# Patient Record
Sex: FEMALE | Race: WHITE | Employment: OTHER | URBAN - METROPOLITAN AREA
[De-identification: names, ages, dates, MRNs, and addresses within clinical notes are randomized per-mention and may not be internally consistent; named-entity substitution may affect disease eponyms.]

---

## 2017-10-30 ENCOUNTER — ALLSCRIPTS OFFICE VISIT (OUTPATIENT)
Dept: OTHER | Facility: OTHER | Age: 82
End: 2017-10-30

## 2018-01-09 NOTE — PROCEDURES
Chief Complaint  Pt here for ear wax removal       Current Meds   1  Acetaminophen 325 MG Oral Tablet; GIVE 2 TABLET ORALLY EVERY 4 HOURS; Therapy: (Recorded:30Oct2017) to Recorded   2  Artificial Tears 1 4 % Ophthalmic Solution; INSTILL 1 DROP FOUR TIMES A DAY; Therapy: (Recorded:30Oct2017) to Recorded   3  Bisac-Evac 10 MG Rectal Suppository; INSERT 1 SUPPOSITORY RECTALLY  EVERY 24   HOURS AS NEEDED; Therapy: (Recorded:30Oct2017) to Recorded   4  Clobetasol Propionate 0 05 % External Ointment; APPLY TO VAGINAL AREA TOPICALLY   ONE TIME A  DAY EVERY MON, FRIDAY; Therapy: (Recorded:30Oct2017) to Recorded   5  Enema ENEM; INSERT 1 SUPPOSITORY RECTALLY EVERY 24 HOURS AS NEEDED; Therapy: (Recorded:30Oct2017) to Recorded   6  Glucosamine 500 MG TABS; GIVE 1 CAPSULE ORALLY ONE TIME A DAY; Therapy: (Recorded:30Oct2017) to Recorded   7  Ibuprofen 400 MG Oral Tablet; GIVE 1 TABLET ORALLY EVERY 6HOURS AS NEEDED   FOR PAIN;   Therapy: (Recorded:30Oct2017) to Recorded   8  Levothyroxine Sodium 75 MCG Oral Tablet; GIVE 1 TABLET ORALLY ONE TIME A DAY; Therapy: (Recorded:30Oct2017) to Recorded   9  Melatonin 3 MG Oral Tablet; GIVE 1 TABLET ORALLY AT BEDTIME; Therapy: (Recorded:30Oct2017) to Recorded    Vitals  Signs    Heart Rate: 113, L Radial  Respiration: 20  Systolic: 433, LUE, Sitting  Diastolic: 80, LUE, Sitting  Weight: 161 lb 6 oz  O2 Saturation: 98    Procedure    Procedure: cerumen removal    Indication: cerumen impaction in both ears  Prep: colace was placed in the canal prior to the procedure  Procedure Note: The procedure was performed by the Provider  A otoscope was placed in the ear canal(s) to visualize the ear canal debris  The ear was cleaned by using warm water irrigation  The procedure was successful  Post-Procedure: Cipro was inserted into the canal(s)  Patient Status: the patient tolerated the procedure well  Complications: there were no complications     Patient instructions: dry ear precautions  Follow-up as needed        Signatures   Electronically signed by : Jamison Romo DO; Oct 30 2017  4:13PM EST                       (Author)

## 2018-01-12 VITALS
HEART RATE: 113 BPM | RESPIRATION RATE: 20 BRPM | OXYGEN SATURATION: 98 % | SYSTOLIC BLOOD PRESSURE: 144 MMHG | WEIGHT: 161.38 LBS | DIASTOLIC BLOOD PRESSURE: 80 MMHG

## 2018-11-13 ENCOUNTER — OFFICE VISIT (OUTPATIENT)
Dept: GERIATRICS | Facility: OTHER | Age: 83
End: 2018-11-13
Payer: MEDICARE

## 2018-11-13 VITALS
HEART RATE: 51 BPM | RESPIRATION RATE: 14 BRPM | DIASTOLIC BLOOD PRESSURE: 58 MMHG | SYSTOLIC BLOOD PRESSURE: 116 MMHG | TEMPERATURE: 97.6 F

## 2018-11-13 DIAGNOSIS — H61.23 HEARING LOSS DUE TO CERUMEN IMPACTION, BILATERAL: Primary | ICD-10-CM

## 2018-11-13 DIAGNOSIS — L30.9 DERMATITIS: ICD-10-CM

## 2018-11-13 PROCEDURE — 99214 OFFICE O/P EST MOD 30 MIN: CPT | Performed by: NURSE PRACTITIONER

## 2018-11-13 PROCEDURE — 69210 REMOVE IMPACTED EAR WAX UNI: CPT | Performed by: NURSE PRACTITIONER

## 2018-11-13 NOTE — PROGRESS NOTES
Ear cerumen removal  Date/Time: 11/13/2018 3:19 PM  Performed by: Jean Earl by: Niki Cabins     Patient location:  Clinic  Indications / Diagnosis:  Hearing loss due to bilateral cerumen impaction  Consent:     Consent obtained:  Verbal    Consent given by:  Patient    Risks discussed:  Bleeding, dizziness, infection, incomplete removal, pain and TM perforation    Alternatives discussed:  No treatment  Universal protocol:     Procedure explained and questions answered to patient or proxy's satisfaction: yes      Patient identity confirmed:  Verbally with patient  Procedure details:     Location:  L ear and R ear    Equipment used:  Ear irrigation syringe

## 2018-11-13 NOTE — PROGRESS NOTES
Assessment/Plan:     1  Dermatitis: Chronic, unknown source  Will discontinue current topical medication due to it irritating her skin  Will order Zinc oxide topical ointment to rash bid  Patient to f/u with Dermatology this coming Thursday 11/15/2018  Continue to keep skin clean and dry  Subjective: Dermatitis     Patient ID: Lesli Ellison is a 80 y o  female  Patient seen and examined today for bilateral ear irrigation and to examine a generalized chronic rash  This rash is in her perineal area, buttocks and back  She has had this rash on and off for the past year  She was treated with multiple medications including anti-fungal medications and oral and topical steroids  She states that the rash is very itchy and burns her on and off during the day and night  Review of Systems   Respiratory: Negative for chest tightness, shortness of breath and wheezing  Cardiovascular: Negative for chest pain, palpitations and leg swelling  Skin: Positive for rash  Negative for color change, pallor and wound  Perineal area, bilateral buttocks and back         Objective:     Physical Exam   Constitutional: She appears well-developed and well-nourished  No distress  Cardiovascular: Normal rate, regular rhythm and normal heart sounds  Exam reveals no gallop and no friction rub  No murmur heard  Pulmonary/Chest: Effort normal and breath sounds normal  No respiratory distress  She has no wheezes  She has no rales  She exhibits no tenderness  Neurological: She is alert  No cranial nerve deficit  Coordination normal    Skin: Rash noted  She is not diaphoretic  There is erythema  No pallor  Generalized erythematous scattered rash to perineal and back area  Bilateral buttocks positive for erythema, inflamed,  with peeling skin  Psychiatric: She has a normal mood and affect   Her behavior is normal  Judgment and thought content normal

## 2018-12-18 ENCOUNTER — OFFICE VISIT (OUTPATIENT)
Dept: GERIATRICS | Facility: OTHER | Age: 83
End: 2018-12-18
Payer: MEDICARE

## 2018-12-18 VITALS
RESPIRATION RATE: 16 BRPM | OXYGEN SATURATION: 97 % | SYSTOLIC BLOOD PRESSURE: 126 MMHG | HEART RATE: 59 BPM | DIASTOLIC BLOOD PRESSURE: 60 MMHG

## 2018-12-18 DIAGNOSIS — Z91.81 STATUS POST FALL: ICD-10-CM

## 2018-12-18 DIAGNOSIS — R26.2 AMBULATORY DYSFUNCTION: Primary | ICD-10-CM

## 2018-12-18 PROBLEM — S22.49XA RIB FRACTURES: Status: ACTIVE | Noted: 2018-12-18

## 2018-12-18 PROBLEM — F03.90 DEMENTIA (HCC): Status: ACTIVE | Noted: 2018-12-18

## 2018-12-18 PROCEDURE — 99214 OFFICE O/P EST MOD 30 MIN: CPT | Performed by: FAMILY MEDICINE

## 2018-12-18 NOTE — ASSESSMENT & PLAN NOTE
-Status post recent fall  Fall seems mechanical in origin, since patient was ambulating without her regular walker  Niece declines PT at this time

## 2018-12-18 NOTE — ASSESSMENT & PLAN NOTE
-Patient ambulates with a walker at baseline   -She did have a recent fall that resulted in L sided rib fractures  -Offered PT referral, niece declined  -Fall precautions are recommended

## 2018-12-18 NOTE — PROGRESS NOTES
Assessment & Plan:     Ambulatory dysfunction  -Patient ambulates with a walker at baseline   -She did have a recent fall that resulted in L sided rib fractures  -Offered PT referral, niece declined  -Fall precautions are recommended  Status post fall  -Status post recent fall  Fall seems mechanical in origin, since patient was ambulating without her regular walker  Niece declines PT at this time  Rib fractures  Complainint of intermittent pain, more prominent with movement  Continue ATC Tylenol for 2 weeks, then resume prn tylenol  Continue Aspercreme-Lidocaine patch to L rib cage area  Continue incentive spirometry  Dementia  No behavioral disturbance  Patient seems to be at baseline  Continue supportive care and delirium precautions  Patient lives in [facility] for about 3 years due to dementia  She presents today with her niece, Narendra Harris  They are here for follow up today  She had a recent fall the past week and was taken to [facility]   She states she does not remember falling  Per staff she was ambulating without her walker  Patient was found to have L 6th, 7th and 8th R fractures  She has since been complaining of pain that bothers her mostly with movement  She has been receiving tylenol as needed for pain with relief as per niece  Patient's niece denies any behaviors, states she is eating and drinking well,   No recent weight gain/loss  Patient is a very poor historian but admits to pain with movement  ROS: Review of Systems   Constitutional: Negative for activity change, appetite change, fatigue, fever and unexpected weight change  HENT: Negative for congestion, dental problem and trouble swallowing  Eyes: Negative for visual disturbance  Respiratory: Negative for apnea, cough, choking, chest tightness and shortness of breath  Cardiovascular: Negative for chest pain, palpitations and leg swelling     Gastrointestinal: Negative for abdominal distention, abdominal pain, constipation, diarrhea, nausea and vomiting  Genitourinary: Negative for difficulty urinating, dysuria and flank pain  Musculoskeletal: Negative for arthralgias and gait problem  Skin: Negative for rash and wound  Psychiatric/Behavioral: Negative for agitation and behavioral problems  Past Medical, surgical, social, medication and allergy history and patients previous records reviewed  Allergies: Allergies not on file    Medications:    No current outpatient prescriptions on file  Vitals:  Vitals:    12/18/18 1522   BP: 126/60   Pulse: 59   Resp: 16   SpO2: 97%       History:  No past medical history on file  No past surgical history on file  No family history on file  Social History     Social History    Marital status:      Spouse name: N/A    Number of children: N/A    Years of education: N/A     Occupational History    Not on file  Social History Main Topics    Smoking status: Not on file    Smokeless tobacco: Not on file    Alcohol use Not on file    Drug use: Unknown    Sexual activity: Not on file     Other Topics Concern    Not on file     Social History Narrative    No narrative on file     No past surgical history on file  Physical Exam:   Physical Exam   Constitutional: She appears well-developed  No distress  HENT:   Head: Normocephalic and atraumatic  Right Ear: External ear normal    Left Ear: External ear normal    Mouth/Throat: Oropharynx is clear and moist  No oropharyngeal exudate  Eyes: Pupils are equal, round, and reactive to light  Conjunctivae are normal  Right eye exhibits no discharge  Left eye exhibits no discharge  No scleral icterus  Neck: No JVD present  No tracheal deviation present  Cardiovascular: Normal rate, regular rhythm, normal heart sounds and intact distal pulses  Exam reveals no gallop and no friction rub  No murmur heard    Pulmonary/Chest: Breath sounds normal  No respiratory distress  Abdominal: Soft  Bowel sounds are normal  She exhibits no distension  There is no tenderness  Musculoskeletal: She exhibits no edema, tenderness or deformity  Neurological: She displays normal reflexes  No cranial nerve deficit  Skin: No rash noted  She is not diaphoretic  No erythema  No pallor

## 2018-12-18 NOTE — ASSESSMENT & PLAN NOTE
Complainint of intermittent pain, more prominent with movement  Continue ATC Tylenol for 2 weeks, then resume prn tylenol  Continue Aspercreme-Lidocaine patch to L rib cage area  Continue incentive spirometry

## 2018-12-18 NOTE — ASSESSMENT & PLAN NOTE
No behavioral disturbance  Patient seems to be at baseline  Continue supportive care and delirium precautions

## 2019-02-18 ENCOUNTER — OFFICE VISIT (OUTPATIENT)
Dept: GERIATRICS | Facility: OTHER | Age: 84
End: 2019-02-18
Payer: MEDICARE

## 2019-02-18 VITALS
WEIGHT: 154 LBS | SYSTOLIC BLOOD PRESSURE: 152 MMHG | RESPIRATION RATE: 16 BRPM | DIASTOLIC BLOOD PRESSURE: 60 MMHG | HEART RATE: 52 BPM | OXYGEN SATURATION: 98 %

## 2019-02-18 DIAGNOSIS — H61.23 HEARING LOSS DUE TO CERUMEN IMPACTION, BILATERAL: Primary | ICD-10-CM

## 2019-02-18 PROCEDURE — 69209 REMOVE IMPACTED EAR WAX UNI: CPT | Performed by: NURSE PRACTITIONER

## 2019-02-18 PROCEDURE — 69210 REMOVE IMPACTED EAR WAX UNI: CPT | Performed by: NURSE PRACTITIONER

## 2019-02-18 RX ORDER — LANOLIN ALCOHOL/MO/W.PET/CERES
CREAM (GRAM) TOPICAL
COMMUNITY

## 2019-02-18 RX ORDER — TRIAMCINOLONE ACETONIDE 1 MG/G
CREAM TOPICAL
COMMUNITY
Start: 2018-11-29

## 2019-02-18 RX ORDER — MULTIVITAMIN,THERAPEUTIC
1 TABLET ORAL DAILY
COMMUNITY

## 2019-02-18 RX ORDER — BISACODYL 10 MG
SUPPOSITORY, RECTAL RECTAL
COMMUNITY

## 2019-02-18 RX ORDER — ACETAMINOPHEN 325 MG/1
TABLET ORAL
COMMUNITY

## 2019-02-18 RX ORDER — GLUCOSAMINE SULFATE 500 MG
CAPSULE ORAL
COMMUNITY

## 2019-02-18 RX ORDER — SENNA PLUS 8.6 MG/1
TABLET ORAL
COMMUNITY

## 2019-02-18 RX ORDER — POLYVINYL ALCOHOL 14 MG/ML
SOLUTION/ DROPS OPHTHALMIC
COMMUNITY

## 2019-02-18 RX ORDER — SALINE 7; 19 G/118ML; G/118ML
ENEMA RECTAL
COMMUNITY

## 2019-02-18 RX ORDER — ACETAMINOPHEN 160 MG
TABLET,DISINTEGRATING ORAL
COMMUNITY

## 2019-02-18 RX ORDER — LEVOTHYROXINE SODIUM 0.07 MG/1
TABLET ORAL
COMMUNITY

## 2019-02-19 NOTE — PROGRESS NOTES
Ear cerumen removal  Date/Time: 2/18/2019 4:30 PM  Performed by: JELLY Rees  Authorized by: JELLY Rees     Patient location:  Clinic  Indications / Diagnosis:  Bilateral hearing loss due to cerumen impaction  Other Assisting Provider: No    Consent:     Consent obtained:  Verbal    Consent given by:  Patient and healthcare agent    Risks discussed:  Bleeding, dizziness, incomplete removal, infection, pain and TM perforation    Alternatives discussed:  No treatment  Universal protocol:     Procedure explained and questions answered to patient or proxy's satisfaction: yes      Patient identity confirmed:  Verbally with patient  Procedure details:     Local anesthetic:  None    Location:  L ear and R ear    Procedure type: irrigation      Approach:  External and natural orifice    Equipment used:  Ear irrigation syringe, curette  Post-procedure details:     Complication:  None    Hearing quality:  Normal    Patient tolerance of procedure:   Tolerated well, no immediate complications

## 2019-03-22 ENCOUNTER — OFFICE VISIT (OUTPATIENT)
Dept: GERIATRICS | Facility: OTHER | Age: 84
End: 2019-03-22
Payer: MEDICARE

## 2019-03-22 VITALS — HEART RATE: 60 BPM | SYSTOLIC BLOOD PRESSURE: 140 MMHG | OXYGEN SATURATION: 96 % | DIASTOLIC BLOOD PRESSURE: 82 MMHG

## 2019-03-22 DIAGNOSIS — L30.9 DERMATITIS: ICD-10-CM

## 2019-03-22 DIAGNOSIS — G30.9 ALZHEIMER'S DEMENTIA WITHOUT BEHAVIORAL DISTURBANCE, UNSPECIFIED TIMING OF DEMENTIA ONSET (HCC): Primary | ICD-10-CM

## 2019-03-22 DIAGNOSIS — Z91.81 PERSONAL HISTORY OF FALL: ICD-10-CM

## 2019-03-22 DIAGNOSIS — I10 ESSENTIAL HYPERTENSION: ICD-10-CM

## 2019-03-22 DIAGNOSIS — I48.0 PAROXYSMAL ATRIAL FIBRILLATION (HCC): ICD-10-CM

## 2019-03-22 DIAGNOSIS — F02.80 ALZHEIMER'S DEMENTIA WITHOUT BEHAVIORAL DISTURBANCE, UNSPECIFIED TIMING OF DEMENTIA ONSET (HCC): Primary | ICD-10-CM

## 2019-03-22 DIAGNOSIS — K59.09 OTHER CONSTIPATION: ICD-10-CM

## 2019-03-22 DIAGNOSIS — E03.8 OTHER SPECIFIED HYPOTHYROIDISM: ICD-10-CM

## 2019-03-22 DIAGNOSIS — G47.09 OTHER INSOMNIA: ICD-10-CM

## 2019-03-22 DIAGNOSIS — R26.2 AMBULATORY DYSFUNCTION: ICD-10-CM

## 2019-03-22 PROCEDURE — 99214 OFFICE O/P EST MOD 30 MIN: CPT | Performed by: NURSE PRACTITIONER

## 2019-03-22 NOTE — ASSESSMENT & PLAN NOTE
Stable, no issues reported by staff  Continue current bowel regime  Encourage PO fluid intake and fiber rich foods

## 2019-03-22 NOTE — PROGRESS NOTES
Assessment/Plan:    Dementia  Stable, no behaviors reported  Continue supportive care and delirium precautions  Personal history of fall  No recent fall reported  Continue to encourage patient to use roller walker for assistance and fall precautions  Dermatitis  Currently resolved  Continue to monitor  Follow-up with Dermatology as needed  Ambulatory dysfunction  No recent falls reported  Continue assistance with roller walker and fall precautions  Her POA refused PT consult after last fall  Essential hypertension  Controlled, she has had some intermittent elevated b/p's for the past month, continue metoprolol  Will monitor electrolytes and renal function  Will continue to monitor vital signs, if B/P remains elevated, will adjust Metoprolol  Other specified hypothyroidism  Stable, continue Levothyroxine  Will monitor TSH level  Other constipation  Stable, no issues reported by staff  Continue current bowel regime  Encourage PO fluid intake and fiber rich foods  Paroxysmal atrial fibrillation (HCC)  Rate controlled, patient not on anticoagulation related to fall risk  Continue Metoprolol  Will monitor electrolytes and renal function  Other insomnia  Stable, no issues reported by nursing  Continue Melatonin and good sleep hygiene  Problem List Items Addressed This Visit        Endocrine    Other specified hypothyroidism     Stable, continue Levothyroxine  Will monitor TSH level  Cardiovascular and Mediastinum    Essential hypertension     Controlled, she has had some intermittent elevated b/p's for the past month, continue metoprolol  Will monitor electrolytes and renal function  Will continue to monitor vital signs, if B/P remains elevated, will adjust Metoprolol  Paroxysmal atrial fibrillation (HCC)     Rate controlled, patient not on anticoagulation related to fall risk  Continue Metoprolol  Will monitor electrolytes and renal function              Nervous and Auditory    Dementia - Primary     Stable, no behaviors reported  Continue supportive care and delirium precautions  Musculoskeletal and Integument    Dermatitis     Currently resolved  Continue to monitor  Follow-up with Dermatology as needed  Other    Ambulatory dysfunction     No recent falls reported  Continue assistance with roller walker and fall precautions  Her POA refused PT consult after last fall  Personal history of fall     No recent fall reported  Continue to encourage patient to use roller walker for assistance and fall precautions  Other constipation     Stable, no issues reported by staff  Continue current bowel regime  Encourage PO fluid intake and fiber rich foods  Other insomnia     Stable, no issues reported by nursing  Continue Melatonin and good sleep hygiene  Subjective:      Patient ID: Jelena Weber is a 80 y o  female  Resident is a 8 year old females seen and examined today to follow-up on chronic conditions with her nurse aid present  She has a history of dementia and is a poor historian  She is without complaint today, calm and cooperative  She fell a couple of months ago and suffered a left rib fracture which is now healed  Patient denies rib pain or shortness of breath  She has a good appetite and is sleeping well at night per nursing  No recent falls reported  The following portions of the patient's history were reviewed and updated as appropriate: allergies, current medications, past family history, past medical history, past social history, past surgical history and problem list     Review of Systems   Constitutional: Negative  HENT: Negative  Eyes: Negative  Respiratory: Negative  Cardiovascular: Negative  Gastrointestinal: Negative  Endocrine: Negative  Genitourinary: Negative  Musculoskeletal: Negative  Allergic/Immunologic: Negative  Neurological: Negative  Hematological: Negative  Psychiatric/Behavioral: Negative  Objective:      /82   Pulse 60   SpO2 96%          Physical Exam   Constitutional: She appears well-developed and well-nourished  No distress  HENT:   Head: Normocephalic and atraumatic  Right Ear: Hearing, tympanic membrane and external ear normal    Left Ear: Hearing, tympanic membrane and external ear normal    Mouth/Throat: Uvula is midline, oropharynx is clear and moist and mucous membranes are normal    Eyes: Pupils are equal, round, and reactive to light  Conjunctivae and EOM are normal  Right eye exhibits no discharge  Left eye exhibits no discharge  No scleral icterus  Neck: Normal range of motion  Neck supple  No thyromegaly present  Cardiovascular: Normal rate and regular rhythm  Exam reveals no gallop and no friction rub  Murmur heard  Pulmonary/Chest: Effort normal  She has decreased breath sounds (bilateral lower lobes)  Abdominal: Soft  Normal appearance and bowel sounds are normal  There is no tenderness  Musculoskeletal: Normal range of motion  Lymphadenopathy:     She has no cervical adenopathy  Neurological: She is alert  She has normal strength  No cranial nerve deficit  Coordination normal    Alert and oriented to person only, disoriented to time and place  Skin: Skin is warm, dry and intact  Capillary refill takes less than 2 seconds  No rash noted  She is not diaphoretic  Psychiatric: She has a normal mood and affect   Her speech is normal and behavior is normal

## 2019-03-22 NOTE — ASSESSMENT & PLAN NOTE
Controlled, she has had some intermittent elevated b/p's for the past month, continue metoprolol  Will monitor electrolytes and renal function  Will continue to monitor vital signs, if B/P remains elevated, will adjust Metoprolol

## 2019-03-22 NOTE — ASSESSMENT & PLAN NOTE
No recent falls reported  Continue assistance with roller walker and fall precautions  Her POA refused PT consult after last fall

## 2019-03-22 NOTE — ASSESSMENT & PLAN NOTE
No recent fall reported  Continue to encourage patient to use roller walker for assistance and fall precautions

## 2019-03-22 NOTE — ASSESSMENT & PLAN NOTE
Rate controlled, patient not on anticoagulation related to fall risk  Continue Metoprolol  Will monitor electrolytes and renal function

## 2019-04-01 PROBLEM — F05 DELIRIUM SUPERIMPOSED ON DEMENTIA: Status: ACTIVE | Noted: 2019-04-01

## 2019-04-01 PROBLEM — M25.552 ACUTE PAIN OF LEFT HIP: Status: ACTIVE | Noted: 2019-04-01

## 2019-04-01 PROBLEM — S72.142A CLOSED COMMINUTED INTERTROCHANTERIC FRACTURE OF LEFT FEMUR (HCC): Status: ACTIVE | Noted: 2019-04-01

## 2019-04-01 PROBLEM — D62 ACUTE BLOOD LOSS ANEMIA: Status: ACTIVE | Noted: 2019-04-01

## 2019-04-01 PROBLEM — R63.8 DECREASED ORAL INTAKE: Status: ACTIVE | Noted: 2019-04-01

## 2019-04-01 PROBLEM — N17.9 AKI (ACUTE KIDNEY INJURY) (HCC): Status: ACTIVE | Noted: 2019-04-01

## 2019-04-15 PROBLEM — K21.9 GASTROESOPHAGEAL REFLUX DISEASE WITHOUT ESOPHAGITIS: Status: ACTIVE | Noted: 2019-04-15

## 2019-04-15 PROBLEM — R60.0 BILATERAL LOWER EXTREMITY EDEMA: Status: ACTIVE | Noted: 2019-04-15

## 2019-04-15 PROBLEM — N39.0 URINARY TRACT INFECTION WITHOUT HEMATURIA: Status: ACTIVE | Noted: 2019-04-15

## 2019-04-15 PROBLEM — K92.1 BLOOD IN STOOL: Status: ACTIVE | Noted: 2019-04-15

## 2019-04-15 PROBLEM — K92.2 GI BLEED: Status: ACTIVE | Noted: 2019-04-15

## 2019-04-15 PROBLEM — R05.9 COUGH: Status: ACTIVE | Noted: 2019-04-15
